# Patient Record
Sex: MALE | Race: WHITE | NOT HISPANIC OR LATINO | Employment: UNEMPLOYED | URBAN - NONMETROPOLITAN AREA
[De-identification: names, ages, dates, MRNs, and addresses within clinical notes are randomized per-mention and may not be internally consistent; named-entity substitution may affect disease eponyms.]

---
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
## 2024-06-19 ENCOUNTER — OFFICE VISIT (OUTPATIENT)
Dept: INTERNAL MEDICINE | Facility: CLINIC | Age: 19
End: 2024-06-19
Payer: COMMERCIAL

## 2024-06-19 ENCOUNTER — LAB (OUTPATIENT)
Dept: LAB | Facility: HOSPITAL | Age: 19
End: 2024-06-19
Payer: COMMERCIAL

## 2024-06-19 VITALS
DIASTOLIC BLOOD PRESSURE: 78 MMHG | WEIGHT: 200 LBS | HEIGHT: 73 IN | BODY MASS INDEX: 26.51 KG/M2 | HEART RATE: 64 BPM | SYSTOLIC BLOOD PRESSURE: 128 MMHG | OXYGEN SATURATION: 98 %

## 2024-06-19 DIAGNOSIS — R53.82 CHRONIC FATIGUE: ICD-10-CM

## 2024-06-19 DIAGNOSIS — R06.83 SNORING: Primary | ICD-10-CM

## 2024-06-19 DIAGNOSIS — R06.81 APNEA: ICD-10-CM

## 2024-06-19 LAB
ALBUMIN SERPL-MCNC: 4.8 G/DL (ref 3.5–5.2)
ALBUMIN/GLOB SERPL: 1.8 G/DL
ALP SERPL-CCNC: 112 U/L (ref 39–117)
ALT SERPL W P-5'-P-CCNC: 19 U/L (ref 1–41)
ANION GAP SERPL CALCULATED.3IONS-SCNC: 11 MMOL/L (ref 5–15)
AST SERPL-CCNC: 20 U/L (ref 1–40)
BILIRUB SERPL-MCNC: 2.5 MG/DL (ref 0–1.2)
BUN SERPL-MCNC: 20 MG/DL (ref 6–20)
BUN/CREAT SERPL: 15 (ref 7–25)
CALCIUM SPEC-SCNC: 9.6 MG/DL (ref 8.6–10.5)
CHLORIDE SERPL-SCNC: 102 MMOL/L (ref 98–107)
CO2 SERPL-SCNC: 28 MMOL/L (ref 22–29)
CREAT SERPL-MCNC: 1.33 MG/DL (ref 0.76–1.27)
DEPRECATED RDW RBC AUTO: 41.7 FL (ref 37–54)
EGFRCR SERPLBLD CKD-EPI 2021: 79 ML/MIN/1.73
ERYTHROCYTE [DISTWIDTH] IN BLOOD BY AUTOMATED COUNT: 13.2 % (ref 12.3–15.4)
GLOBULIN UR ELPH-MCNC: 2.7 GM/DL
GLUCOSE SERPL-MCNC: 92 MG/DL (ref 65–99)
HCT VFR BLD AUTO: 42.5 % (ref 37.5–51)
HGB BLD-MCNC: 15 G/DL (ref 13–17.7)
MCH RBC QN AUTO: 30.7 PG (ref 26.6–33)
MCHC RBC AUTO-ENTMCNC: 35.3 G/DL (ref 31.5–35.7)
MCV RBC AUTO: 86.9 FL (ref 79–97)
PLATELET # BLD AUTO: 227 10*3/MM3 (ref 140–450)
PMV BLD AUTO: 9.1 FL (ref 6–12)
POTASSIUM SERPL-SCNC: 4.4 MMOL/L (ref 3.5–5.2)
PROT SERPL-MCNC: 7.5 G/DL (ref 6–8.5)
RBC # BLD AUTO: 4.89 10*6/MM3 (ref 4.14–5.8)
SODIUM SERPL-SCNC: 141 MMOL/L (ref 136–145)
WBC NRBC COR # BLD AUTO: 7.28 10*3/MM3 (ref 3.4–10.8)

## 2024-06-19 PROCEDURE — 80050 GENERAL HEALTH PANEL: CPT

## 2024-06-19 PROCEDURE — 36415 COLL VENOUS BLD VENIPUNCTURE: CPT

## 2024-06-19 PROCEDURE — 82306 VITAMIN D 25 HYDROXY: CPT

## 2024-06-19 PROCEDURE — 82607 VITAMIN B-12: CPT

## 2024-06-19 PROCEDURE — 99214 OFFICE O/P EST MOD 30 MIN: CPT | Performed by: INTERNAL MEDICINE

## 2024-06-19 NOTE — PROGRESS NOTES
Chief Complaint   Patient presents with    Snoring    Fatigue         History:  Derrell Ortiz is a 18 y.o. male who presents today for evaluation of the above problems.      HPI    Derrell presents today for an acute visit.  He reports feeling fatigued almost his entire life.  He is also had issues with sleeping over the same time span.  His mother recently witnessed him having an apneic episode, and his college roommate notes the same.  He is always been a snorer and has been able to sleep pretty much at any time.      ROS:  Review of Systems  As above    Current Outpatient Medications:     meloxicam (MOBIC) 15 MG tablet, Take 1 tablet by mouth Daily As Needed for Moderate Pain. (Patient not taking: Reported on 6/19/2024), Disp: 90 tablet, Rfl: 3    Lab Results   Component Value Date    GLUCOSE 114 (H) 12/19/2022    BUN 13 12/19/2022    CREATININE 1.17 12/19/2022    EGFR 92.7 12/19/2022    BCR 11.1 12/19/2022    K 4.6 12/19/2022    CO2 29.0 12/19/2022    CALCIUM 9.9 12/19/2022    ALBUMIN 4.70 12/19/2022    BILITOT 2.6 (H) 12/19/2022    BILITOT 2.6 (H) 12/19/2022    AST 17 12/19/2022    ALT 15 12/19/2022       WBC   Date Value Ref Range Status   07/01/2021 9.90 3.40 - 10.80 10*3/mm3 Final     RBC   Date Value Ref Range Status   07/01/2021 5.26 4.14 - 5.80 10*6/mm3 Final     Hemoglobin   Date Value Ref Range Status   07/01/2021 15.4 13.0 - 17.7 g/dL Final     Hematocrit   Date Value Ref Range Status   07/01/2021 43.3 37.5 - 51.0 % Final     MCV   Date Value Ref Range Status   07/01/2021 82.3 79.0 - 97.0 fL Final     MCH   Date Value Ref Range Status   07/01/2021 29.3 26.6 - 33.0 pg Final     MCHC   Date Value Ref Range Status   07/01/2021 35.6 31.5 - 35.7 g/dL Final     RDW   Date Value Ref Range Status   07/01/2021 13.5 12.3 - 15.4 % Final     MPV   Date Value Ref Range Status   07/01/2021 9.0 6.0 - 12.0 fL Final     Platelets   Date Value Ref Range Status   07/01/2021 237 140 - 450 10*3/mm3 Final     Neutrophil %  "  Date Value Ref Range Status   07/01/2021 50.6 42.7 - 76.0 % Final     Lymphocyte %   Date Value Ref Range Status   07/01/2021 37.6 19.6 - 45.3 % Final     Neutrophils, Absolute   Date Value Ref Range Status   07/01/2021 5.00 1.70 - 7.00 10*3/mm3 Final     Lymphocytes, Absolute   Date Value Ref Range Status   07/01/2021 3.70 (H) 0.70 - 3.10 10*3/mm3 Final         OBJECTIVE:  Visit Vitals  /78 (BP Location: Right arm, Patient Position: Sitting, Cuff Size: Adult)   Pulse 64   Ht 185.4 cm (73\")   Wt 90.7 kg (200 lb)   SpO2 98%   BMI 26.39 kg/m²      Physical Exam  Vitals and nursing note reviewed.   Constitutional:       General: He is not in acute distress.     Appearance: Normal appearance. He is well-developed. He is not ill-appearing or toxic-appearing.      Comments: Pleasant   HENT:      Head: Normocephalic and atraumatic.      Nose:      Right Turbinates: Not enlarged, swollen or pale.      Left Turbinates: Not enlarged, swollen or pale.      Mouth/Throat:      Lips: Pink.      Mouth: Mucous membranes are moist.      Tongue: No lesions.      Palate: No mass.      Tonsils: 1+ on the right. 1+ on the left.   Eyes:      General: Scleral icterus (Mild) present.      Pupils: Pupils are equal, round, and reactive to light.   Neck:      Thyroid: No thyromegaly.      Trachea: Phonation normal.   Pulmonary:      Effort: No respiratory distress.   Skin:     Coloration: Skin is not pale.      Findings: No erythema.   Neurological:      Mental Status: He is alert.   Psychiatric:         Behavior: Behavior normal.         Thought Content: Thought content normal.         Judgment: Judgment normal.           [place] Sleepiness scale    Sitting and Reading: 3  Watching TV: 3  Sitting, inactive in a public place: 2  As a passenger in a car for an hour without a break: 3  Lying down to rest in the afternoon: 3  Sitting and talking to someone: 1  Sitting quietly after a lunch without alcohol: 2  In a car, while stopped for " a few minutes in traffic: 1  Total: 18      Assessment/Plan      Diagnoses and all orders for this visit:    1. Snoring (Primary)  -     Home Sleep Study; Future    2. Apnea  -     Home Sleep Study; Future    3. Chronic fatigue  -     Comprehensive Metabolic Panel; Future  -     CBC (No Diff); Future  -     TSH; Future  -     Vitamin D,25-Hydroxy; Future  -     Vitamin B12; Future      Highly suspect that Derrell has obstructive sleep apnea as a cause for his symptoms.  His [place] Sleepiness Scale was 18 and we will try to get a home sleep study while he is in town.  Refer to sleep clinic if this suggests he has sleep apnea.    I would also like to evaluate for other causes for fatigue with blood work as above.    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    Follow-up around 7 weeks for his annual physical with me or follow-up sooner if indicated.    Return in about 7 weeks (around 8/7/2024) for Annual physical with me (ok to use new pt slot).      SRIKANTH Rodriguez MD  16:10 CDT  6/19/2024   Electronically signed

## 2024-06-20 LAB
25(OH)D3 SERPL-MCNC: 37.6 NG/ML (ref 30–100)
TSH SERPL DL<=0.05 MIU/L-ACNC: 1.53 UIU/ML (ref 0.27–4.2)
VIT B12 BLD-MCNC: 677 PG/ML (ref 211–946)

## 2024-07-24 ENCOUNTER — HOSPITAL ENCOUNTER (OUTPATIENT)
Dept: SLEEP CENTER | Age: 19
Discharge: HOME | End: 2024-07-26
Payer: COMMERCIAL

## 2024-07-24 PROCEDURE — G0399 HOME SLEEP TEST/TYPE 3 PORTA: HCPCS

## 2024-07-25 PROCEDURE — G0399 HOME SLEEP TEST/TYPE 3 PORTA: HCPCS

## 2024-08-07 ENCOUNTER — OFFICE VISIT (OUTPATIENT)
Dept: INTERNAL MEDICINE | Facility: CLINIC | Age: 19
End: 2024-08-07
Payer: COMMERCIAL

## 2024-08-07 VITALS
OXYGEN SATURATION: 97 % | DIASTOLIC BLOOD PRESSURE: 68 MMHG | BODY MASS INDEX: 26.37 KG/M2 | HEIGHT: 73 IN | WEIGHT: 199 LBS | SYSTOLIC BLOOD PRESSURE: 116 MMHG | HEART RATE: 82 BPM

## 2024-08-07 DIAGNOSIS — Z00.00 ENCOUNTER FOR PREVENTIVE CARE: Primary | ICD-10-CM

## 2024-08-07 DIAGNOSIS — E66.3 OVERWEIGHT (BMI 25.0-29.9): ICD-10-CM

## 2024-08-07 DIAGNOSIS — Z13.6 HYPERTENSION SCREEN: ICD-10-CM

## 2024-08-07 DIAGNOSIS — Z13.31 DEPRESSION SCREEN: ICD-10-CM

## 2024-08-07 PROCEDURE — 99395 PREV VISIT EST AGE 18-39: CPT | Performed by: INTERNAL MEDICINE

## 2024-08-07 RX ORDER — BACLOFEN 10 MG/1
10 TABLET ORAL
COMMUNITY
Start: 2024-07-25

## 2024-08-07 NOTE — PROGRESS NOTES
Chief Complaint   Patient presents with    Annual Exam         History:  Derrell Ortiz is a 18 y.o. male who presents today for evaluation of the above problems.      HPI    Derrell presents today for his annual physical.  Overall, he has been doing well.    He completed a home sleep study on July 24, 2024.  He did not have any evidence of sleep apnea but he did snore.      He is not currently up-to-date on his eye or dental exam.    Denies any tobacco use, recreational drug use or alcohol use.      Social History     Socioeconomic History    Marital status: Single   Tobacco Use    Smoking status: Never    Smokeless tobacco: Never   Vaping Use    Vaping status: Never Used   Substance and Sexual Activity    Alcohol use: No    Drug use: Never    Sexual activity: Never       PHQ-9 Depression Screening  Little interest or pleasure in doing things? 0-->not at all   Feeling down, depressed, or hopeless? 0-->not at all   Trouble falling or staying asleep, or sleeping too much?     Feeling tired or having little energy?     Poor appetite or overeating?     Feeling bad about yourself - or that you are a failure or have let yourself or your family down?     Trouble concentrating on things, such as reading the newspaper or watching television?     Moving or speaking so slowly that other people could have noticed? Or the opposite - being so fidgety or restless that you have been moving around a lot more than usual?     Thoughts that you would be better off dead, or of hurting yourself in some way?     PHQ-9 Total Score 0   If you checked off any problems, how difficult have these problems made it for you to do your work, take care of things at home, or get along with other people?         PHQ-9 Total Score: 0    ROS:  Review of Systems   Constitutional:  Negative for activity change, appetite change, fatigue, fever and unexpected weight change.   HENT: Negative.     Eyes: Negative.    Respiratory:  Negative for cough, chest  tightness and shortness of breath.    Cardiovascular:  Negative for chest pain, palpitations and leg swelling.   Gastrointestinal:  Negative for abdominal pain, constipation, diarrhea, nausea and vomiting.   Endocrine: Negative for cold intolerance and heat intolerance.   Genitourinary: Negative.    Musculoskeletal: Negative.  Negative for back pain, neck pain and neck stiffness.   Skin:  Negative for rash and wound.   Neurological:  Negative for dizziness, syncope, weakness, light-headedness and headaches.   Hematological:  Negative for adenopathy. Does not bruise/bleed easily.   Psychiatric/Behavioral:  Negative for agitation, behavioral problems and confusion.          Current Outpatient Medications:     baclofen (LIORESAL) 10 MG tablet, Take 1 tablet by mouth., Disp: , Rfl:     meloxicam (MOBIC) 15 MG tablet, Take 1 tablet by mouth Daily As Needed for Moderate Pain., Disp: 90 tablet, Rfl: 3    Lab Results   Component Value Date    GLUCOSE 92 06/19/2024    BUN 20 06/19/2024    CREATININE 1.33 (H) 06/19/2024     06/19/2024    K 4.4 06/19/2024     06/19/2024    CALCIUM 9.6 06/19/2024    PROTEINTOT 7.5 06/19/2024    ALBUMIN 4.8 06/19/2024    ALT 19 06/19/2024    AST 20 06/19/2024    ALKPHOS 112 06/19/2024    BILITOT 2.5 (H) 06/19/2024    GLOB 2.7 06/19/2024    AGRATIO 1.8 06/19/2024    BCR 15.0 06/19/2024    ANIONGAP 11.0 06/19/2024    EGFR 79.0 06/19/2024       WBC   Date Value Ref Range Status   06/19/2024 7.28 3.40 - 10.80 10*3/mm3 Final     RBC   Date Value Ref Range Status   06/19/2024 4.89 4.14 - 5.80 10*6/mm3 Final     Hemoglobin   Date Value Ref Range Status   06/19/2024 15.0 13.0 - 17.7 g/dL Final     Hematocrit   Date Value Ref Range Status   06/19/2024 42.5 37.5 - 51.0 % Final     MCV   Date Value Ref Range Status   06/19/2024 86.9 79.0 - 97.0 fL Final     MCH   Date Value Ref Range Status   06/19/2024 30.7 26.6 - 33.0 pg Final     [facility]   Date Value Ref Range Status   06/19/2024 35.3 31.5 -  "35.7 g/dL Final     RDW   Date Value Ref Range Status   06/19/2024 13.2 12.3 - 15.4 % Final     RDW-SD   Date Value Ref Range Status   06/19/2024 41.7 37.0 - 54.0 fl Final     MPV   Date Value Ref Range Status   06/19/2024 9.1 6.0 - 12.0 fL Final     Platelets   Date Value Ref Range Status   06/19/2024 227 140 - 450 10*3/mm3 Final     Neutrophil %   Date Value Ref Range Status   07/01/2021 50.6 42.7 - 76.0 % Final     Lymphocyte %   Date Value Ref Range Status   07/01/2021 37.6 19.6 - 45.3 % Final     Neutrophils, Absolute   Date Value Ref Range Status   07/01/2021 5.00 1.70 - 7.00 10*3/mm3 Final     Lymphocytes, Absolute   Date Value Ref Range Status   07/01/2021 3.70 (H) 0.70 - 3.10 10*3/mm3 Final         OBJECTIVE:  Visit Vitals  /68 (BP Location: Right arm, Patient Position: Sitting, Cuff Size: Adult)   Pulse 82   Ht 185.4 cm (73\")   Wt 90.3 kg (199 lb)   SpO2 97%   BMI 26.25 kg/m²      Physical Exam  Vitals and nursing note reviewed.   Constitutional:       General: He is not in acute distress.     Appearance: Normal appearance. He is well-developed. He is not ill-appearing or toxic-appearing.      Comments: Pleasant   HENT:      Head: Normocephalic and atraumatic.      Right Ear: Tympanic membrane, ear canal and external ear normal. There is no impacted cerumen.      Left Ear: Tympanic membrane, ear canal and external ear normal. There is no impacted cerumen.   Eyes:      General: No scleral icterus.     Conjunctiva/sclera: Conjunctivae normal.      Pupils: Pupils are equal, round, and reactive to light.   Neck:      Thyroid: No thyromegaly.      Vascular: No JVD.   Cardiovascular:      Rate and Rhythm: Normal rate and regular rhythm.      Heart sounds: Normal heart sounds. No murmur heard.     No friction rub. No gallop.   Pulmonary:      Effort: Pulmonary effort is normal. No respiratory distress.      Breath sounds: Normal breath sounds. No stridor. No wheezing, rhonchi or rales.   Abdominal:      " General: Abdomen is flat. There is no distension.      Palpations: Abdomen is soft.      Tenderness: There is no abdominal tenderness.   Musculoskeletal:      Right lower leg: No edema.      Left lower leg: No edema.   Skin:     General: Skin is warm and dry.      Coloration: Skin is not jaundiced.   Neurological:      General: No focal deficit present.      Mental Status: He is alert.      Cranial Nerves: No cranial nerve deficit.   Psychiatric:         Mood and Affect: Mood normal.         Behavior: Behavior normal.         Thought Content: Thought content normal.         Judgment: Judgment normal.           Assessment/Plan    Diagnoses and all orders for this visit:    1. Encounter for preventive care (Primary)    2. Depression screen    3. Hypertension screen    4. Overweight (BMI 25.0-29.9)      Overall, Derrell is doing very well.  I do not think you need to repeat any blood work today.  In June he had labs showing a creatinine of 1.33 but his estimated GFR was 79.  This is likely related to muscle mass and his supplement of creatinine.  Depression screen negative with a PHQ 2 of 0.  Hypertension screen was negative for the blood pressure 116/68.    Based on the BMI of 26.25 he is considered overweight, but he is of muscular build.  I am not concerned about Derrell's weight as he remains very active as well.      Counseling/Anticipatory Guidance Discussed: nutrition, physical activity, healthy weight, misuse of tobacco, alcohol and drugs, dental health, mental health, and immunizations    Pediatric BMI = 82 %ile (Z= 0.90) based on CDC (Boys, 2-20 Years) BMI-for-age based on BMI available as of 8/7/2024.. BMI is >= 25 and <30. (Overweight) The following options were offered after discussion;: weight loss educational material (shared in after visit summary)      Return in about 1 year (around 8/7/2025) for Annual physical.    Patient was given instructions and counseling regarding his/her condition or for health  maintenance advice. Please see specific information pulled into the AVS if appropriate.     SRIKANTH Rodriguez MD  14:05 CDT  8/7/2024   Electronically signed

## 2024-08-08 ENCOUNTER — TELEPHONE (OUTPATIENT)
Dept: SLEEP CENTER | Age: 19
End: 2024-08-08

## 2024-08-08 NOTE — TELEPHONE ENCOUNTER
Spoke to Mr. Kenyon Yeboah regarding his HST performed  07/24/2024 and 07/25/2024.  The HST revealed an AHI within normal limits.  Mr. Yeboah was advised to follow-up with his ordering provider,German Sousa M.D,,  and discuss the recommendations made by the interpreting physician.

## 2025-05-28 ENCOUNTER — TELEPHONE (OUTPATIENT)
Age: 20
End: 2025-05-28

## 2025-05-28 NOTE — TELEPHONE ENCOUNTER
Patients mom states someone from [facility] tried to call her regarding son and that they spoke to patients dad on 5/27

## 2025-05-28 NOTE — TELEPHONE ENCOUNTER
Patient will see about having current MRI auth in [place] cancelled to better aid in auth if Dr Tan wishes to order a alternative study.  They are going to inqurie about a xray disc if these were performed.

## 2025-06-03 ENCOUNTER — TELEPHONE (OUTPATIENT)
Age: 20
End: 2025-06-03

## 2025-06-03 ENCOUNTER — OFFICE VISIT (OUTPATIENT)
Age: 20
End: 2025-06-03
Payer: COMMERCIAL

## 2025-06-03 VITALS — HEIGHT: 73 IN | BODY MASS INDEX: 27.17 KG/M2 | WEIGHT: 205 LBS

## 2025-06-03 DIAGNOSIS — S43.432A GLENOID LABRAL TEAR, LEFT, INITIAL ENCOUNTER: ICD-10-CM

## 2025-06-03 DIAGNOSIS — R52 PAIN: Primary | ICD-10-CM

## 2025-06-03 PROCEDURE — 99203 OFFICE O/P NEW LOW 30 MIN: CPT | Performed by: PHYSICIAN ASSISTANT

## 2025-06-03 NOTE — PROGRESS NOTES
Orthopaedic History and Physical - New Patient    NAME:  Kenyon Yeboah   : 2005  MRN: [id number]      6/3/2025     CHIEF COMPLAINT: Right shoulder pain    HISTORY OF PRESENT ILLNESS:   This is a pleasant 19-year-old  who comes in complaining of right shoulder pain.  Patient was playing pickle ball was going for an overhead hit when he felt and heard a pop in his right shoulder.  Patient's had moderate pain since this time.  Sharp, piercing intermittent with certain movements.  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  He tried to return to pitching and was unable to secondary to the pain.  He is from the [facility].  He came here for evaluation.  He denies any significant instability.  No similar complaints.      Past Medical History:    No past medical history on file.    Past Surgical History:        Procedure Laterality Date    TYMPANOSTOMY TUBE PLACEMENT         Current Medications:   Prior to Admission medications    Not on File       Allergies:  Shellfish-derived products and Penicillins    Social History:   Social History     Socioeconomic History    Marital status: Single     Spouse name: Not on file    Number of children: Not on file    Years of education: Not on file    Highest education level: Not on file   Occupational History    Not on file   Tobacco Use    Smoking status: Never    Smokeless tobacco: Never   Substance and Sexual Activity    Alcohol use: Not on file    Drug use: Not on file    Sexual activity: Not on file   Other Topics Concern    Not on file   Social History Narrative    Not on file     Social Drivers of Health     Financial Resource Strain: Not on file   Food Insecurity: Not on file   Transportation Needs: Not on file   Physical Activity: Not on file   Stress: Not on file   Social Connections: Unknown (10/11/2023)    [facility]    Family and Community Support     Help with Day-to-Day Activities: Not on file

## 2025-06-03 NOTE — TELEPHONE ENCOUNTER
Ada Called and would like for a nurse to call her back at     She has questions about the mri that was ordered

## 2025-06-04 DIAGNOSIS — R52 PAIN: ICD-10-CM

## 2025-06-04 DIAGNOSIS — S43.432A GLENOID LABRAL TEAR, LEFT, INITIAL ENCOUNTER: Primary | ICD-10-CM

## 2025-06-05 ENCOUNTER — HOSPITAL ENCOUNTER (OUTPATIENT)
Dept: MRI IMAGING | Age: 20
Discharge: HOME | End: 2025-06-05
Payer: COMMERCIAL

## 2025-06-05 ENCOUNTER — APPOINTMENT (OUTPATIENT)
Dept: GENERAL RADIOLOGY | Age: 20
End: 2025-06-05
Payer: COMMERCIAL

## 2025-06-05 ENCOUNTER — HOSPITAL ENCOUNTER (OUTPATIENT)
Dept: GENERAL RADIOLOGY | Age: 20
Discharge: HOME | End: 2025-06-05
Payer: COMMERCIAL

## 2025-06-05 ENCOUNTER — OFFICE VISIT (OUTPATIENT)
Age: 20
End: 2025-06-05
Payer: COMMERCIAL

## 2025-06-05 DIAGNOSIS — S43.432A GLENOID LABRAL TEAR, LEFT, INITIAL ENCOUNTER: Primary | ICD-10-CM

## 2025-06-05 DIAGNOSIS — S43.432A GLENOID LABRAL TEAR, LEFT, INITIAL ENCOUNTER: ICD-10-CM

## 2025-06-05 DIAGNOSIS — R52 PAIN: ICD-10-CM

## 2025-06-05 DIAGNOSIS — S43.431A GLENOID LABRAL TEAR, RIGHT, INITIAL ENCOUNTER: ICD-10-CM

## 2025-06-05 PROCEDURE — 77002 NEEDLE LOCALIZATION BY XRAY: CPT

## 2025-06-05 PROCEDURE — 99213 OFFICE O/P EST LOW 20 MIN: CPT | Performed by: PHYSICIAN ASSISTANT

## 2025-06-05 PROCEDURE — 73221 MRI JOINT UPR EXTREM W/O DYE: CPT

## 2025-06-05 PROCEDURE — 6360000004 HC RX CONTRAST MEDICATION: Performed by: PHYSICIAN ASSISTANT

## 2025-06-05 PROCEDURE — A9577 INJ MULTIHANCE: HCPCS | Performed by: PHYSICIAN ASSISTANT

## 2025-06-05 RX ORDER — IOPAMIDOL 408 MG/ML
10 INJECTION, SOLUTION INTRATHECAL
Status: COMPLETED | OUTPATIENT
Start: 2025-06-05 | End: 2025-06-05

## 2025-06-05 RX ADMIN — GADOBENATE DIMEGLUMINE 1 ML: 529 INJECTION, SOLUTION INTRAVENOUS at 10:52

## 2025-06-05 RX ADMIN — IOPAMIDOL 5 ML: 408 INJECTION, SOLUTION INTRATHECAL at 09:40

## 2025-06-05 NOTE — PROGRESS NOTES
Orthopaedic Clinic Note - Established Patient    NAME:  Kenyon Yeboah   : 2005  MRN: [id number]      2025      CHIEF COMPLAINT: MR arthrogram follow-up right shoulder      HISTORY OF PRESENT ILLNESS:   This is a pleasant 19-year-old  who comes in complaining of right shoulder pain.  Patient was playing pickle ball was going for an overhead hit when he felt and heard a pop in his right shoulder.  Patient's had moderate pain since this time.  Sharp, piercing intermittent with certain movements.  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  He is from the [facility].  He came here for evaluation.  He denies any significant instability.  No similar complaints.     The above note copied from date of service 6/3/2025:    Patient in for follow-up today for an MR arthrogram to rule out labral tear.  MRI came back showing concerns of possible labral tear.  Patient reports his symptoms are about the same.  He is here for further evaluation treatment options.    Past Medical History:    No past medical history on file.    Past Surgical History:        Procedure Laterality Date    TYMPANOSTOMY TUBE PLACEMENT         Current Medications:   Prior to Admission medications    Not on File       Allergies: Shellfish-derived products and Penicillins    System Neg/Pos Details   Constitutional negative   Fatigue and Fever.   Respiratory negative   Cough and Dyspnea.   Cardio negative   Chest pain.   GI negative   Abdominal pain and Vomiting.    negative   Urinary incontinence.   Neuro negative   Headache.   Psych negative   Psychiatric symptoms.       PHYSICAL EXAM:    There were no vitals taken for this visit.    General:  Appears stated age, no distress.  Orientation:  Alert and oriented to time, place, and person.  Mood and Affect:  Cooperative and pleasant.    Musculoskeletal:  Range of motion is full.  Positive [place]'s.  Negative

## 2025-06-09 DIAGNOSIS — M25.511 RIGHT SHOULDER PAIN, UNSPECIFIED CHRONICITY: Primary | ICD-10-CM

## 2025-08-01 ENCOUNTER — OFFICE VISIT (OUTPATIENT)
Age: 20
End: 2025-08-01
Payer: COMMERCIAL

## 2025-08-01 VITALS — WEIGHT: 210 LBS | HEIGHT: 73 IN | BODY MASS INDEX: 27.83 KG/M2

## 2025-08-01 DIAGNOSIS — M25.531 RIGHT WRIST PAIN: Primary | ICD-10-CM

## 2025-08-01 DIAGNOSIS — S66.911A STRAIN OF RIGHT WRIST, INITIAL ENCOUNTER: ICD-10-CM

## 2025-08-01 PROCEDURE — 99214 OFFICE O/P EST MOD 30 MIN: CPT | Performed by: PHYSICIAN ASSISTANT

## 2025-08-01 RX ORDER — METHYLPREDNISOLONE 4 MG/1
TABLET ORAL
Qty: 1 KIT | Refills: 0 | Status: SHIPPED | OUTPATIENT
Start: 2025-08-01 | End: 2025-08-07

## 2025-08-01 NOTE — PROGRESS NOTES
Orthopaedic Clinic Note - Established Patient    NAME:  Kenyon Yeboah   : 2005  MRN: [id number]      2025      CHIEF COMPLAINT: Right wrist pain      HISTORY OF PRESENT ILLNESS:   This is a pleasant 19-year-old gentleman who comes in for new problem.  Patient states back on  he was doing some kettle bell swings with his right wrist when the next day he woke up had complaints of pain in his right wrist.  He states over the ulnar styloid region.  He denies hearing a pop or snap.  He denies any bruising or swelling.  The pain has improved since the date of initial discomfort.  Patient been doing therapy for a labral tear.  He is getting stronger and much better with his shoulder.  He wanted his wrist evaluated.      Past Medical History:    No past medical history on file.    Past Surgical History:        Procedure Laterality Date    TYMPANOSTOMY TUBE PLACEMENT         Current Medications:   Prior to Admission medications    Not on File       Allergies: Shellfish-derived products and Penicillins    System Neg/Pos Details   Constitutional negative   Fatigue and Fever.   Respiratory negative   Cough and Dyspnea.   Cardio negative   Chest pain.   GI negative   Abdominal pain and Vomiting.    negative   Urinary incontinence.   Neuro negative   Headache.   Psych negative   Psychiatric symptoms.       PHYSICAL EXAM:    Ht 1.854 m (6' 1\")   Wt 95.3 kg (210 lb)   BMI 27.71 kg/m²     General:  Appears stated age, no distress.  Orientation:  Alert and oriented to time, place, and person.  Mood and Affect:  Cooperative and pleasant.    Musculoskeletal:  Looking at his wrist bilaterally there is no significant edema, no obvious bony abnormalities.  Point tenderness over his and just proximal to the ulnar styloid.  No tenderness in the TFCC to palpation.  Mildly positive piano sign on the right compared to the left.  Range of motion is full without pain or discomfort.  Neuro vas tact distally.    Radiology:   XR

## 2025-08-28 ENCOUNTER — OFFICE VISIT (OUTPATIENT)
Age: 20
End: 2025-08-28

## 2025-08-28 ENCOUNTER — HOSPITAL ENCOUNTER (OUTPATIENT)
Dept: MRI IMAGING | Age: 20
Discharge: HOME | End: 2025-08-28
Payer: COMMERCIAL

## 2025-08-28 DIAGNOSIS — M25.531 RIGHT WRIST PAIN: ICD-10-CM

## 2025-08-28 DIAGNOSIS — S66.911A STRAIN OF RIGHT WRIST, INITIAL ENCOUNTER: ICD-10-CM

## 2025-08-28 DIAGNOSIS — S69.81XA TFCC (TRIANGULAR FIBROCARTILAGE COMPLEX) INJURY, RIGHT, INITIAL ENCOUNTER: Primary | ICD-10-CM

## 2025-08-28 PROCEDURE — 73221 MRI JOINT UPR EXTREM W/O DYE: CPT

## 2025-08-28 RX ORDER — BETAMETHASONE SODIUM PHOSPHATE AND BETAMETHASONE ACETATE 3; 3 MG/ML; MG/ML
6 INJECTION, SUSPENSION INTRA-ARTICULAR; INTRALESIONAL; INTRAMUSCULAR; SOFT TISSUE ONCE
Status: COMPLETED | OUTPATIENT
Start: 2025-08-28 | End: 2025-08-28

## 2025-08-28 RX ADMIN — BETAMETHASONE SODIUM PHOSPHATE AND BETAMETHASONE ACETATE 6 MG: 3; 3 INJECTION, SUSPENSION INTRA-ARTICULAR; INTRALESIONAL; INTRAMUSCULAR; SOFT TISSUE at 14:00
